# Patient Record
Sex: FEMALE | Race: AMERICAN INDIAN OR ALASKA NATIVE
[De-identification: names, ages, dates, MRNs, and addresses within clinical notes are randomized per-mention and may not be internally consistent; named-entity substitution may affect disease eponyms.]

---

## 2017-09-06 ENCOUNTER — HOSPITAL ENCOUNTER (EMERGENCY)
Dept: HOSPITAL 42 - ED | Age: 52
Discharge: HOME | End: 2017-09-06
Payer: MEDICARE

## 2017-09-06 VITALS
RESPIRATION RATE: 17 BRPM | OXYGEN SATURATION: 95 % | DIASTOLIC BLOOD PRESSURE: 80 MMHG | SYSTOLIC BLOOD PRESSURE: 120 MMHG | TEMPERATURE: 98.3 F | HEART RATE: 65 BPM
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
## 2017-09-06 DIAGNOSIS — T78.40XA: Primary | ICD-10-CM

## 2017-09-06 NOTE — ED PDOC
Arrival/HPI





- General


Chief Complaint: Allergic Reaction


Time Seen by Provider: 09/06/17 16:13


Historian: Patient





- History of Present Illness


Narrative History of Present Illness (Text): 


09/06/17 16:13


A 52 year old female, with no significant past medical history, presents to the 

emergency department complaining of allergic reaction.  Patient reports she 

woke up this morning and ate some flavored oatmeal.  Approximately 30 minutes 

later, patient's upper lip became tight and swollen.  Patient denies of any 

shortness of breath, itchiness of throat, difficulty swallowing, voice changes, 

or any other complaints. 





PMD: Dr. Ceballos





Time/Duration: 4-6 hours


Symptom Onset: Sudden


Symptom Course: Unchanged





Past Medical History





- Provider Review


Nursing Documentation Reviewed: Yes





- Reproductive


Menopause: Yes





- Cardiac


Hx Hypertension: Yes





- Pulmonary


Hx Respiratory Disorders: No





- Neurological


Hx Neurological Disorder: No





- HEENT


Hx HEENT Disorder: No





- Renal


Hx Renal Disorder: No





- Endocrine/Metabolic


Hx Endocrine Disorders: No





- Hematological/Oncological


Hx Blood Disorders: No





- Integumentary


Hx Dermatological Disorder: No





- Musculoskeletal/Rheumatological


Hx Musculoskeletal Disorders: No





- Gastrointestinal


Hx Gastrointestinal Disorders: No





- Genitourinary/Gynecological


Hx Genitourinary Disorders: No





- Psychiatric


Hx Psychophysiologic Disorder: No


Hx Substance Use: No





- Surgical History


Other/Comment: BRAIN





Family/Social History





- Physician Review


Nursing Documentation Reviewed: Yes


Family/Social History: No Known Family HX


Smoking Status: Never Smoked


Hx Alcohol Use: No


Hx Substance Use: No





Allergies/Home Meds


Allergies/Adverse Reactions: 


Allergies





No Known Allergies Allergy (Verified 09/06/17 15:49)


 











Review of Systems





- Physician Review


All systems were reviewed & negative as marked: Yes





- Review of Systems


ENT: absent: Voice Changes, Other (no difficulty swallowing, no itchy throat)


Respiratory: absent: SOB





Physical Exam


Vital Signs Reviewed: Yes


Vital Signs











  Temp Pulse Resp BP Pulse Ox


 


 09/06/17 15:49  98.3 F  65  17  120/80  95


 


 09/06/17 15:40  98.3 F  65  18  120/80  95











Temperature: Afebrile


Blood Pressure: Normal


Pulse: Regular


Respiratory Rate: Normal


Appearance: Positive for: Well-Appearing


Pain Distress: None


Mental Status: Positive for: Alert and Oriented X 3





- Systems Exam


Head: Present: Atraumatic, Normocephalic


Pupils: Present: PERRL


Extroacular Muscles: Present: EOMI


Conjunctiva: Present: Normal


Mouth: Present: Other (swelling of upper lip)


Neck: Present: Normal Range of Motion


Respiratory/Chest: Present: Clear to Auscultation, Good Air Exchange.  No: 

Respiratory Distress, Accessory Muscle Use


Cardiovascular: Present: Regular Rate and Rhythm, Normal S1, S2.  No: Murmurs


Abdomen: Present: Normal Bowel Sounds.  No: Tenderness, Distention, Peritoneal 

Signs


Back: Present: Normal Inspection


Upper Extremity: Present: Normal Inspection.  No: Cyanosis, Edema


Lower Extremity: Present: Normal Inspection.  No: Edema


Neurological: Present: GCS=15, CN II-XII Intact, Speech Normal


Skin: Present: Warm, Dry, Normal Color.  No: Rashes


Psychiatric: Present: Alert, Oriented x 3, Normal Insight, Normal Concentration





Medical Decision Making


ED Course and Treatment: 


09/06/17 16:17


Impression: 52 year old female with allergic reaction.  Physical exam shows 

swelling of upper lip.





Plan:


-- Benadryl


-- Pepcid


-- Prednisone


-- Reassess and disposition





Progress Notes:





Patient's upper lip feels better, less tight, but still swollen. Patient has no 

other symptoms, she has been discharged home with medications.














- Medication Orders


Current Medication Orders: 











Discontinued Medications





Diphenhydramine HCl (Benadryl)  50 mg PO STAT STA


   Stop: 09/06/17 16:14


   Last Admin: 09/06/17 16:23  Dose: 50 mg





Famotidine (Pepcid)  20 mg PO STAT STA


   Stop: 09/06/17 16:14


   Last Admin: 09/06/17 16:23  Dose: 20 mg





Prednisone (Prednisone Tab)  60 mg PO STAT STA


   Stop: 09/06/17 16:14


   Last Admin: 09/06/17 16:23  Dose: 60 mg











- Scribe Statement


The provider has reviewed the documentation as recorded by the Raheem Wright





Provider Scribe Provider Scribe Attestation:


All medical record entries made by the Scribe were at my direction and 

personally dictated by me. I have reviewed the chart and agree that the record 

accurately reflects my personal performance of the history, physical exam, 

medical decision making, and the department course for this patient. I have 

also personally directed, reviewed, and agree with the discharge instructions 

and disposition.














Disposition/Present on Arrival





- Present on Arrival


Any Indicators Present on Arrival: No


History of DVT/PE: No


History of Uncontrolled Diabetes: No


Urinary Catheter: No


History of Decub. Ulcer: No


History Surgical Site Infection Following: None





- Disposition


Have Diagnosis and Disposition been Completed?: Yes


Diagnosis: 


 Allergic reaction





Disposition: HOME/ ROUTINE


Disposition Time: 16:33


Condition: STABLE


Discharge Instructions (ExitCare):  Allergies (ED)


Additional Instructions: 


Follow up with your PMD within 1-2 days. Return to ED if feel worse.


Prescriptions: 


DiphenhydrAMINE [Benadryl] 25 mg PO .Q4-6 H #30 cap


Epinephrine [Epipen] 0.3 mg IJ ONCE #1 auto.injct


Famotidine [Pepcid] 20 mg PO BID #20 tab


predniSONE [predniSONE Tab] 2 tab PO DAILY #8 tab


Referrals: 


Facundo Ceballos MD [Primary Care Provider] - Follow up with primary


Forms:  [employer] [facility] (English)

## 2018-11-16 ENCOUNTER — HOSPITAL ENCOUNTER (EMERGENCY)
Dept: HOSPITAL 42 - ED | Age: 53
Discharge: HOME | End: 2018-11-16
Payer: MEDICARE

## 2018-11-16 VITALS — OXYGEN SATURATION: 98 % | DIASTOLIC BLOOD PRESSURE: 76 MMHG | TEMPERATURE: 98.6 F | SYSTOLIC BLOOD PRESSURE: 138 MMHG

## 2018-11-16 VITALS — RESPIRATION RATE: 18 BRPM | HEART RATE: 88 BPM

## 2018-11-16 DIAGNOSIS — L73.9: Primary | ICD-10-CM

## 2018-11-22 NOTE — ED PDOC
Arrival/HPI





- General


Chief Complaint: Abnormal Skin Integrity


Time Seen by Provider: 11/16/18 12:45


Historian: Patient





- History of Present Illness


Narrative History of Present Illness (Text): 





53 year old female with past medical history of hypertension presents to the 

emergency department complaining rash x 4 days. Patient reports pruritic papular

rash to bilateral medial upper arms and bilateral posterior thighs. Patient has 

been applying moisturizing lotion without relief. No sick contacts, recent 

travel, or new detergents / lotions. Denies fever, chills, shortness of breath, 

cough, sinus congestion, rash on palms or soles, mouth lesions, abdominal pain, 

sore throat, headache, or any other associated symptoms.








Past Medical History





- Provider Review


Nursing Documentation Reviewed: Yes





- Infectious Disease


Hx of Infectious Diseases: None





- Reproductive


Menopause: Yes





- Cardiac


Hx Hypertension: Yes





- Pulmonary


Hx Respiratory Disorders: No





- Neurological


Hx Neurological Disorder: No





- HEENT


Hx HEENT Disorder: No





- Renal


Hx Renal Disorder: No





- Endocrine/Metabolic


Hx Endocrine Disorders: No





- Hematological/Oncological


Hx Blood Disorders: No





- Integumentary


Hx Dermatological Disorder: No





- Musculoskeletal/Rheumatological


Hx Musculoskeletal Disorders: No





- Gastrointestinal


Hx Gastrointestinal Disorders: No





- Genitourinary/Gynecological


Hx Genitourinary Disorders: No





- Psychiatric


Hx Psychophysiologic Disorder: No


Hx Substance Use: No





- Surgical History


Other/Comment: BRAIN





- Anesthesia


Hx Anesthesia: No


Hx Anesthesia Reactions: No


Hx Malignant Hyperthermia: No





Family/Social History





- Physician Review


Nursing Documentation Reviewed: Yes


Family/Social History: No Known Family HX


Smoking Status: Never Smoked


Hx Alcohol Use: No


Hx Substance Use: No





Allergies/Home Meds


Allergies/Adverse Reactions: 


Allergies





No Known Allergies Allergy (Verified 11/16/18 12:36)


   











Review of Systems





- Review of Systems


Constitutional: Normal.  absent: Fevers


Eyes: Normal.  absent: Vision Changes


ENT: Normal.  absent: Sore Throat, Sinus Congestion


Respiratory: Normal.  absent: SOB, Cough


Cardiovascular: Normal.  absent: Chest Pain, Palpitations, Syncope


Gastrointestinal: Normal.  absent: Abdominal Pain, Nausea, Vomiting, Appetite 

Changes


Genitourinary Female: Normal.  absent: Dysuria, Frequency, Vaginal Discharge


Musculoskeletal: Normal.  absent: Back Pain, Neck Pain


Skin: Rash


Neurological: Normal.  absent: Headache, Dizziness


Endocrine: Normal


Hemo/Lymphatic: Normal.  absent: Adenopathy


Psychiatric: Normal





Physical Exam


Vital Signs Reviewed: Yes





Vital Signs











  Temp Pulse Resp BP Pulse Ox


 


 11/16/18 14:00  98.6 F  88  18   98


 


 11/16/18 12:26  98.6 F  86  19  138/76  98











Temperature: Afebrile


Blood Pressure: Normal


Pulse: Regular


Respiratory Rate: Normal


Appearance: Positive for: Well-Appearing, Non-Toxic, Comfortable


Pain Distress: None


Mental Status: Positive for: Alert and Oriented X 3





- Systems Exam


Head: Present: Atraumatic, Normocephalic


Pupils: Present: PERRL


Extroacular Muscles: Present: EOMI


Conjunctiva: Present: Normal


Ears: Present: Normal, NORMAL TM


Mouth: Present: Moist Mucous Membranes


Pharnyx: Present: Normal.  No: ERYTHEMA, EXUDATE, TONSILS ENLARGED


Nose (External): Present: Atraumatic


Nose (Internal): Present: Normal Inspection, Moist


Neck: Present: Normal Range of Motion


Respiratory/Chest: Present: Clear to Auscultation, Good Air Exchange.  No: 

Respiratory Distress, Accessory Muscle Use, Decreased Breath Sounds


Cardiovascular: Present: Regular Rate and Rhythm, Normal S1, S2, Peripheal 

Pulses Present.  No: Murmurs


Abdomen: Present: Normal Bowel Sounds.  No: Tenderness, Distention, Peritoneal 

Signs


Back: Present: Normal Inspection


Upper Extremity: Present: Normal ROM, NORMAL PULSES, Neurovascularly Intact, 

Capillary Refill < 2s, Other (papular rash to bilateral medial upper arm; no 

erythema or signs of secondary infection).  No: Cyanosis, Edema, Tenderness, 

Swelling, Temperature Abnormalties


Lower Extremity: Present: NORMAL PULSES, Normal ROM, Neurovascularly Intact, 

Capillary Refill < 2 s, Other (papular rash to bilateral posterior thighs; no 

erythema or signs of secondary infection).  No: Edema, Tenderness, Swelling, 

Temperature Abnormalties


Neurological: Present: GCS=15, CN II-XII Intact, Speech Normal, Motor Func 

Grossly Intact, Normal Sensory Function, Gait Normal


Skin: Present: Warm, Dry, Rashes (papular rash to bilateral medial upper arms 

and posterior thighs; no erythema or signs of secondary infection), Normal 

Color.  No: Erythematous, Hot, Abscess


Lymphatic: No: Cervical Adenopathy


Psychiatric: Present: Alert, Oriented x 3, Normal Insight, Normal Concentration,

Normal Affect, Normal Mood





Medical Decision Making


ED Course and Treatment: 





Initial Plan:


* Prednisone


* Pepcid


* Benadryl





Patient refusing Benadryl because she has to drive home. Will give pepcid and 

prednisone.


Patient evaluated by Dr. Bang at bedside, who agrees with plan of care and 

disposition.





Patient reports decreased pruritus after medications. Asking to leave emergency 

department.





Plan of care discussed with patient, and strict instructions given regarding 

prescriptions, importance of follow up, and signs to return to Emergency 

Department, to include fever, chills, spreading of rash, or any other new

/worsening symptoms. Patient verbalizes understanding of discussion.  Patient 

A&Ox3, ambulating with steady gait, stable for discharge home.





Impression:


Folliculitis








- Medication Orders


Current Medication Orders: 














Discontinued Medications





Famotidine (Pepcid)  20 mg PO STAT STA


   Stop: 11/16/18 13:24


   Last Admin: 11/16/18 13:45  Dose: 20 mg





Prednisone (Prednisone Tab)  60 mg PO STAT ONE


   Stop: 11/16/18 13:24


   Last Admin: 11/16/18 13:43  Dose: 60 mg











Disposition/Present on Arrival





- Present on Arrival


Any Indicators Present on Arrival: No


History of DVT/PE: No


History of Uncontrolled Diabetes: No


Urinary Catheter: No


History of Decub. Ulcer: No


History Surgical Site Infection Following: None





- Disposition


Have Diagnosis and Disposition been Completed?: Yes


Diagnosis: 


 Folliculitis





Disposition: HOME/ ROUTINE


Disposition Time: 13:45


Patient Plan: Discharge


Condition: GOOD


Discharge Instructions (ExitCare):  Folliculitis (DC)


Additional Instructions: 


Apply thin layer of cream to affected area twice daily on to clean skin


Do not use cream on face


Take 1-2 benadryl as needed for itching every 6 hours, this may make you sleepy


Followup with primary doctor within 2 days


Return to Er for new/worsening symptoms


Prescriptions: 


DiphenhydrAMINE [Benadryl] 25 mg PO Q6H PRN #20 cap


 PRN Reason: Itching / Pruritus


Hydrocortisone 1% Cream [Cortizone 1% Cream] 1 applic TOP BID PRN #1 tube


 PRN Reason: Itching / Pruritus


Referrals: 


Elvi Ceballos MD [Primary Care Provider] - Follow up with primary


Forms:  [employer] [facility] (English), WORK NOTE